# Patient Record
Sex: MALE | Race: WHITE | NOT HISPANIC OR LATINO | Employment: OTHER | URBAN - METROPOLITAN AREA
[De-identification: names, ages, dates, MRNs, and addresses within clinical notes are randomized per-mention and may not be internally consistent; named-entity substitution may affect disease eponyms.]

---

## 2019-09-01 ENCOUNTER — HOSPITAL ENCOUNTER (INPATIENT)
Facility: MEDICAL CENTER | Age: 74
LOS: 2 days | DRG: 069 | End: 2019-09-03
Attending: EMERGENCY MEDICINE | Admitting: HOSPITALIST
Payer: MEDICARE

## 2019-09-01 ENCOUNTER — HOSPITAL ENCOUNTER (OUTPATIENT)
Dept: RADIOLOGY | Facility: MEDICAL CENTER | Age: 74
End: 2019-09-01

## 2019-09-01 ENCOUNTER — APPOINTMENT (OUTPATIENT)
Dept: RADIOLOGY | Facility: MEDICAL CENTER | Age: 74
DRG: 069 | End: 2019-09-01
Attending: HOSPITALIST
Payer: MEDICARE

## 2019-09-01 DIAGNOSIS — I63.9 CEREBROVASCULAR ACCIDENT (CVA), UNSPECIFIED MECHANISM (HCC): ICD-10-CM

## 2019-09-01 DIAGNOSIS — I48.0 PAF (PAROXYSMAL ATRIAL FIBRILLATION) (HCC): ICD-10-CM

## 2019-09-01 DIAGNOSIS — I63.9 STROKE (HCC): ICD-10-CM

## 2019-09-01 DIAGNOSIS — I48.0 PAROXYSMAL A-FIB (HCC): ICD-10-CM

## 2019-09-01 DIAGNOSIS — Z79.01 ANTICOAGULATED: ICD-10-CM

## 2019-09-01 PROBLEM — D69.6 THROMBOCYTOPENIA (HCC): Status: ACTIVE | Noted: 2019-09-01

## 2019-09-01 LAB
ALBUMIN SERPL BCP-MCNC: 4.3 G/DL (ref 3.2–4.9)
ALBUMIN/GLOB SERPL: 1.4 G/DL
ALP SERPL-CCNC: 47 U/L (ref 30–99)
ALT SERPL-CCNC: 6 U/L (ref 2–50)
ANION GAP SERPL CALC-SCNC: 10 MMOL/L (ref 0–11.9)
AST SERPL-CCNC: 16 U/L (ref 12–45)
BASOPHILS # BLD AUTO: 0.3 % (ref 0–1.8)
BASOPHILS # BLD: 0.01 K/UL (ref 0–0.12)
BILIRUB SERPL-MCNC: 0.8 MG/DL (ref 0.1–1.5)
BUN SERPL-MCNC: 20 MG/DL (ref 8–22)
CALCIUM SERPL-MCNC: 9.3 MG/DL (ref 8.5–10.5)
CHLORIDE SERPL-SCNC: 104 MMOL/L (ref 96–112)
CO2 SERPL-SCNC: 24 MMOL/L (ref 20–33)
CREAT SERPL-MCNC: 0.93 MG/DL (ref 0.5–1.4)
EKG IMPRESSION: NORMAL
EKG IMPRESSION: NORMAL
EOSINOPHIL # BLD AUTO: 0.05 K/UL (ref 0–0.51)
EOSINOPHIL NFR BLD: 1.3 % (ref 0–6.9)
ERYTHROCYTE [DISTWIDTH] IN BLOOD BY AUTOMATED COUNT: 49.2 FL (ref 35.9–50)
EST. AVERAGE GLUCOSE BLD GHB EST-MCNC: 108 MG/DL
GLOBULIN SER CALC-MCNC: 3.1 G/DL (ref 1.9–3.5)
GLUCOSE SERPL-MCNC: 106 MG/DL (ref 65–99)
HBA1C MFR BLD: 5.4 % (ref 0–5.6)
HCT VFR BLD AUTO: 44.3 % (ref 42–52)
HGB BLD-MCNC: 14.9 G/DL (ref 14–18)
IMM GRANULOCYTES # BLD AUTO: 0.01 K/UL (ref 0–0.11)
IMM GRANULOCYTES NFR BLD AUTO: 0.3 % (ref 0–0.9)
INR PPP: 1.02 (ref 0.87–1.13)
LYMPHOCYTES # BLD AUTO: 1.07 K/UL (ref 1–4.8)
LYMPHOCYTES NFR BLD: 28.8 % (ref 22–41)
MAGNESIUM SERPL-MCNC: 2.3 MG/DL (ref 1.5–2.5)
MCH RBC QN AUTO: 30.7 PG (ref 27–33)
MCHC RBC AUTO-ENTMCNC: 33.6 G/DL (ref 33.7–35.3)
MCV RBC AUTO: 91.2 FL (ref 81.4–97.8)
MONOCYTES # BLD AUTO: 0.41 K/UL (ref 0–0.85)
MONOCYTES NFR BLD AUTO: 11.1 % (ref 0–13.4)
NEUTROPHILS # BLD AUTO: 2.16 K/UL (ref 1.82–7.42)
NEUTROPHILS NFR BLD: 58.2 % (ref 44–72)
NRBC # BLD AUTO: 0 K/UL
NRBC BLD-RTO: 0 /100 WBC
PLATELET # BLD AUTO: 152 K/UL (ref 164–446)
PMV BLD AUTO: 9.7 FL (ref 9–12.9)
POTASSIUM SERPL-SCNC: 4.1 MMOL/L (ref 3.6–5.5)
PROT SERPL-MCNC: 7.4 G/DL (ref 6–8.2)
PROTHROMBIN TIME: 13.6 SEC (ref 12–14.6)
RBC # BLD AUTO: 4.86 M/UL (ref 4.7–6.1)
SODIUM SERPL-SCNC: 138 MMOL/L (ref 135–145)
TROPONIN T SERPL-MCNC: 17 NG/L (ref 6–19)
WBC # BLD AUTO: 3.7 K/UL (ref 4.8–10.8)

## 2019-09-01 PROCEDURE — 700117 HCHG RX CONTRAST REV CODE 255: Performed by: INTERNAL MEDICINE

## 2019-09-01 PROCEDURE — 83735 ASSAY OF MAGNESIUM: CPT

## 2019-09-01 PROCEDURE — A9270 NON-COVERED ITEM OR SERVICE: HCPCS | Performed by: INTERNAL MEDICINE

## 2019-09-01 PROCEDURE — 84484 ASSAY OF TROPONIN QUANT: CPT

## 2019-09-01 PROCEDURE — 770020 HCHG ROOM/CARE - TELE (206)

## 2019-09-01 PROCEDURE — 99223 1ST HOSP IP/OBS HIGH 75: CPT | Mod: AI,25 | Performed by: HOSPITALIST

## 2019-09-01 PROCEDURE — 80053 COMPREHEN METABOLIC PANEL: CPT

## 2019-09-01 PROCEDURE — A9270 NON-COVERED ITEM OR SERVICE: HCPCS | Performed by: HOSPITALIST

## 2019-09-01 PROCEDURE — 93010 ELECTROCARDIOGRAM REPORT: CPT | Performed by: INTERNAL MEDICINE

## 2019-09-01 PROCEDURE — 83036 HEMOGLOBIN GLYCOSYLATED A1C: CPT

## 2019-09-01 PROCEDURE — 85025 COMPLETE CBC W/AUTO DIFF WBC: CPT

## 2019-09-01 PROCEDURE — 70551 MRI BRAIN STEM W/O DYE: CPT

## 2019-09-01 PROCEDURE — 700102 HCHG RX REV CODE 250 W/ 637 OVERRIDE(OP): Performed by: INTERNAL MEDICINE

## 2019-09-01 PROCEDURE — 99285 EMERGENCY DEPT VISIT HI MDM: CPT

## 2019-09-01 PROCEDURE — 70498 CT ANGIOGRAPHY NECK: CPT

## 2019-09-01 PROCEDURE — 93005 ELECTROCARDIOGRAM TRACING: CPT | Performed by: INTERNAL MEDICINE

## 2019-09-01 PROCEDURE — 700102 HCHG RX REV CODE 250 W/ 637 OVERRIDE(OP): Performed by: HOSPITALIST

## 2019-09-01 PROCEDURE — 85610 PROTHROMBIN TIME: CPT

## 2019-09-01 PROCEDURE — 93005 ELECTROCARDIOGRAM TRACING: CPT | Performed by: EMERGENCY MEDICINE

## 2019-09-01 PROCEDURE — 36415 COLL VENOUS BLD VENIPUNCTURE: CPT

## 2019-09-01 PROCEDURE — 99358 PROLONG SERVICE W/O CONTACT: CPT | Performed by: HOSPITALIST

## 2019-09-01 PROCEDURE — 70496 CT ANGIOGRAPHY HEAD: CPT

## 2019-09-01 RX ORDER — LORAZEPAM 1 MG/1
0.5 TABLET ORAL
Status: COMPLETED | OUTPATIENT
Start: 2019-09-01 | End: 2019-09-01

## 2019-09-01 RX ORDER — ONDANSETRON 2 MG/ML
4 INJECTION INTRAMUSCULAR; INTRAVENOUS EVERY 4 HOURS PRN
Status: DISCONTINUED | OUTPATIENT
Start: 2019-09-01 | End: 2019-09-03 | Stop reason: HOSPADM

## 2019-09-01 RX ORDER — LABETALOL HYDROCHLORIDE 5 MG/ML
10 INJECTION, SOLUTION INTRAVENOUS EVERY 4 HOURS PRN
Status: DISCONTINUED | OUTPATIENT
Start: 2019-09-01 | End: 2019-09-03 | Stop reason: HOSPADM

## 2019-09-01 RX ORDER — LORAZEPAM 1 MG/1
0.5 TABLET ORAL ONCE
Status: DISCONTINUED | OUTPATIENT
Start: 2019-09-01 | End: 2019-09-01

## 2019-09-01 RX ORDER — POLYETHYLENE GLYCOL 3350 17 G/17G
1 POWDER, FOR SOLUTION ORAL
Status: DISCONTINUED | OUTPATIENT
Start: 2019-09-01 | End: 2019-09-03 | Stop reason: HOSPADM

## 2019-09-01 RX ORDER — AMOXICILLIN 250 MG
2 CAPSULE ORAL 2 TIMES DAILY
Status: DISCONTINUED | OUTPATIENT
Start: 2019-09-01 | End: 2019-09-03 | Stop reason: HOSPADM

## 2019-09-01 RX ORDER — HYDRALAZINE HYDROCHLORIDE 20 MG/ML
10 INJECTION INTRAMUSCULAR; INTRAVENOUS
Status: DISCONTINUED | OUTPATIENT
Start: 2019-09-01 | End: 2019-09-03 | Stop reason: HOSPADM

## 2019-09-01 RX ORDER — GABAPENTIN 300 MG/1
300 CAPSULE ORAL 3 TIMES DAILY
Status: DISCONTINUED | OUTPATIENT
Start: 2019-09-01 | End: 2019-09-03 | Stop reason: HOSPADM

## 2019-09-01 RX ORDER — ASPIRIN 81 MG/1
324 TABLET, CHEWABLE ORAL DAILY
Status: DISCONTINUED | OUTPATIENT
Start: 2019-09-01 | End: 2019-09-02

## 2019-09-01 RX ORDER — ATORVASTATIN CALCIUM 80 MG/1
80 TABLET, FILM COATED ORAL EVERY EVENING
Status: DISCONTINUED | OUTPATIENT
Start: 2019-09-01 | End: 2019-09-03 | Stop reason: HOSPADM

## 2019-09-01 RX ORDER — BISACODYL 10 MG
10 SUPPOSITORY, RECTAL RECTAL
Status: DISCONTINUED | OUTPATIENT
Start: 2019-09-01 | End: 2019-09-03 | Stop reason: HOSPADM

## 2019-09-01 RX ORDER — ASPIRIN 300 MG/1
300 SUPPOSITORY RECTAL DAILY
Status: DISCONTINUED | OUTPATIENT
Start: 2019-09-01 | End: 2019-09-02

## 2019-09-01 RX ORDER — ASPIRIN 325 MG
325 TABLET ORAL DAILY
Status: DISCONTINUED | OUTPATIENT
Start: 2019-09-01 | End: 2019-09-02

## 2019-09-01 RX ORDER — ONDANSETRON 4 MG/1
4 TABLET, ORALLY DISINTEGRATING ORAL EVERY 4 HOURS PRN
Status: DISCONTINUED | OUTPATIENT
Start: 2019-09-01 | End: 2019-09-03 | Stop reason: HOSPADM

## 2019-09-01 RX ADMIN — RIVAROXABAN 20 MG: 20 TABLET, FILM COATED ORAL at 16:32

## 2019-09-01 RX ADMIN — IOHEXOL 80 ML: 350 INJECTION, SOLUTION INTRAVENOUS at 10:49

## 2019-09-01 RX ADMIN — ATORVASTATIN CALCIUM 80 MG: 80 TABLET, FILM COATED ORAL at 16:31

## 2019-09-01 RX ADMIN — GABAPENTIN 300 MG: 300 CAPSULE ORAL at 12:03

## 2019-09-01 RX ADMIN — GABAPENTIN 300 MG: 300 CAPSULE ORAL at 16:35

## 2019-09-01 RX ADMIN — ASPIRIN 325 MG: 325 TABLET, FILM COATED ORAL at 05:18

## 2019-09-01 RX ADMIN — GABAPENTIN 300 MG: 300 CAPSULE ORAL at 05:18

## 2019-09-01 RX ADMIN — LORAZEPAM 0.5 MG: 1 TABLET ORAL at 21:42

## 2019-09-01 ASSESSMENT — ENCOUNTER SYMPTOMS
ABDOMINAL PAIN: 0
COUGH: 0
WEAKNESS: 1
HEARTBURN: 0
CHILLS: 0
SHORTNESS OF BREATH: 1
FEVER: 0
HALLUCINATIONS: 0
NAUSEA: 0
BLURRED VISION: 0
SENSORY CHANGE: 1
EYE DISCHARGE: 0
SPEECH CHANGE: 1
FOCAL WEAKNESS: 0
DIZZINESS: 0
DEPRESSION: 0
PALPITATIONS: 0
BRUISES/BLEEDS EASILY: 0
HEMOPTYSIS: 0
DOUBLE VISION: 0
FLANK PAIN: 0
VOMITING: 0
MYALGIAS: 0

## 2019-09-01 ASSESSMENT — PATIENT HEALTH QUESTIONNAIRE - PHQ9
2. FEELING DOWN, DEPRESSED, IRRITABLE, OR HOPELESS: NOT AT ALL
1. LITTLE INTEREST OR PLEASURE IN DOING THINGS: NOT AT ALL
SUM OF ALL RESPONSES TO PHQ9 QUESTIONS 1 AND 2: 0

## 2019-09-01 ASSESSMENT — LIFESTYLE VARIABLES
AVERAGE NUMBER OF DAYS PER WEEK YOU HAVE A DRINK CONTAINING ALCOHOL: 0
EVER HAD A DRINK FIRST THING IN THE MORNING TO STEADY YOUR NERVES TO GET RID OF A HANGOVER: NO
HAVE PEOPLE ANNOYED YOU BY CRITICIZING YOUR DRINKING: NO
TOTAL SCORE: 0
DOES PATIENT WANT TO STOP DRINKING: NO
ON A TYPICAL DAY WHEN YOU DRINK ALCOHOL HOW MANY DRINKS DO YOU HAVE: 0
CONSUMPTION TOTAL: NEGATIVE
HAVE YOU EVER FELT YOU SHOULD CUT DOWN ON YOUR DRINKING: NO
EVER_SMOKED: NEVER
EVER FELT BAD OR GUILTY ABOUT YOUR DRINKING: NO
HOW MANY TIMES IN THE PAST YEAR HAVE YOU HAD 5 OR MORE DRINKS IN A DAY: 0
TOTAL SCORE: 0
TOTAL SCORE: 0
SUBSTANCE_ABUSE: 0
DOES PATIENT WANT TO TALK TO SOMEONE ABOUT QUITTING: NO
ALCOHOL_USE: NO

## 2019-09-01 ASSESSMENT — CHA2DS2 SCORE
CHF OR LEFT VENTRICULAR DYSFUNCTION: NO
DIABETES: NO
AGE 75 OR GREATER: NO
SEX: MALE
HYPERTENSION: YES
AGE 65 TO 74: YES
CHA2DS2 VASC SCORE: 4
VASCULAR DISEASE: NO
PRIOR STROKE OR TIA OR THROMBOEMBOLISM: YES

## 2019-09-01 NOTE — ASSESSMENT & PLAN NOTE
NIH Stroke scale of 4 on admission, Outside of window for TPA   Previous history of stroke and PFO  continue home xarelto  Asa and statin   CTA head and neck normal  MRI without acute stroke, hemorrhage or mass, did show areas of encephalomalacia in the right thalamus and adjacent internal capsule, posterior left temporal lobe and left cerebellum, in keeping with areas of remote ischemic injury, moderate atrophy, mild white matter changes  Echocardiogram pending  Pt/ot/speech evaluation   Monitor BP  Not taking home meds, wonder about ability to perform ADLs, may need SNF or HH

## 2019-09-01 NOTE — ED PROVIDER NOTES
ED Provider Note    Scribed for South Avendaño M.D. by Justine Larkin. 9/1/2019,  3:46 AM.    Means of Arrival: Med Flight  History obtained from: Patient  History limited by: None    CHIEF COMPLAINT  Chief Complaint   Patient presents with   • Numbness       HPI  Leander Sanz is a 73 y.o. male with a history of a stroke 6 months ago who presents to the Emergency Department for numbness onset prior to arrival. Per EMS, the patient was playing basketball at 3PM when he started feeling numbness on his left arm, shoulder and leg. Patient associates shortness of breath, but denies any fever, cough, hematuria, or chest pain. They further state the numbness lead to left sided facial droop that has since resolved. In addition, EMS mentions the patient has been irregularly taking his Xarelto and Gabapentin and that his head CT in an outside facility was negative for any abnormalities. Patient denies any present stroke symptoms that are continuous from his past stroke into today's visit. No fever or dysuria.     REVIEW OF SYSTEMS  CONSTITUTIONAL:  No fever.  CARDIOVASCULAR:  No chest discomfort.  RESPIRATORY:  No pleuritic chest pain, cough.  GASTROINTESTINAL:  No abdominal pain.  GENITOURINARY:   No dysuria, hematuria.  MUSCULOSKELETAL:  No arthralgia.  SKIN:  No rash or suspicious lesions.  NEUROLOGIC:  Numbness on his left arm, shoulder and leg. No headache.  ENDOCRINE:  No facial edema.  HEMATOLOGIC:  No abnormal bleeding.     See HPI for further details.   All other systems are negative.     PAST MEDICAL HISTORY  Past Medical History:   Diagnosis Date   • Hyperlipemia    • Hypertension    • Stroke (HCC)        FAMILY HISTORY  No family history on file.    SOCIAL HISTORY   reports that he has never smoked. He has never used smokeless tobacco. He reports that he does not drink alcohol or use drugs.    SURGICAL HISTORY  History reviewed. No pertinent surgical history.    CURRENT MEDICATIONS  Current  "Facility-Administered Medications:   •  Pharmacy consult request - Allow for permissive hypertension: SBP up to 220 mmHg/DBP up to 120 mmHg x 48 hours, , Other, PHARMACY TO DOSE, Ivis Stacy M.D.  •  labetalol (NORMODYNE,TRANDATE) injection 10 mg, 10 mg, Intravenous, Q4HRS PRN **OR** hydrALAZINE (APRESOLINE) injection 10 mg, 10 mg, Intravenous, Q2HRS PRN, Ivis Stacy M.D.  •  atorvastatin (LIPITOR) tablet 80 mg, 80 mg, Oral, Q EVENING, Ivis Stacy M.D.  •  aspirin (ASA) tablet 325 mg, 325 mg, Oral, DAILY, 325 mg at 09/01/19 0518 **OR** aspirin (ASA) chewable tab 324 mg, 324 mg, Oral, DAILY **OR** aspirin (ASA) suppository 300 mg, 300 mg, Rectal, DAILY, Ivis Stacy M.D.  •  senna-docusate (PERICOLACE or SENOKOT S) 8.6-50 MG per tablet 2 Tab, 2 Tab, Oral, BID **AND** polyethylene glycol/lytes (MIRALAX) PACKET 1 Packet, 1 Packet, Oral, QDAY PRN **AND** magnesium hydroxide (MILK OF MAGNESIA) suspension 30 mL, 30 mL, Oral, QDAY PRN **AND** bisacodyl (DULCOLAX) suppository 10 mg, 10 mg, Rectal, QDAY PRN, Ivis Stacy M.D.  •  ondansetron (ZOFRAN) syringe/vial injection 4 mg, 4 mg, Intravenous, Q4HRS PRN, Ivis Stacy M.D.  •  ondansetron (ZOFRAN ODT) dispertab 4 mg, 4 mg, Oral, Q4HRS PRN, Ivis Stacy M.D.  •  rivaroxaban (XARELTO) tablet 20 mg, 20 mg, Oral, PM MEAL, Ivis Stacy M.D.  •  gabapentin (NEURONTIN) capsule 300 mg, 300 mg, Oral, TID, Ivis Stacy M.D., 300 mg at 09/01/19 0518      ALLERGIES  No Known Allergies    PHYSICAL EXAM  VITAL SIGNS: BP (!) 172/97   Pulse 99   Resp 19   Ht 1.956 m (6' 5\")   Wt 102.1 kg (225 lb)   BMI 26.68 kg/m²    Gen: Alert, no acute distress  HEENT: ATNC  Eyes: PERRL, EOMI, normal conjunctiva.   Neck: trachea midline  Resp: no respiratory distress  CV: No JVD  Abd: non-distended  Ext: No deformities  Psych: normal mood  Neuro: Mild sensory loss, left side ataxia, speech fluent. NIH Score of 4 (2 for level of consciousness, 1 for " limb ataxia, 1 for sensory deficits)    DIAGNOSTIC STUDIES / PROCEDURES     EKG  Results for orders placed or performed during the hospital encounter of 19   EKG   Result Value Ref Range    Report       [facility]    Test Date:  2019  Pt Name:    ZOE PALUMBO                Department: ER  MRN:        [id number]                      Room:       [facility]  Gender:     Male                         Technician: [id number]  :        1945                   Requested By:SOUTH AVENDAÑO  Order #:    [id number]                    Reading MD: South Avendaño    Measurements  Intervals                                Axis  Rate:       59                           P:          36  PA:         192                          QRS:        -44  QRSD:       98                           T:          125  QT:         436  QTc:        432    Interpretive Statements  SINUS BRADYCARDIA  LEFT AXIS DEVIATION  EARLY PRECORDIAL R/S TRANSITION  BORDERLINE REPOLARIZATION ABNORMALITY  Compared to ECG 2014 18:14:33  Left-axis deviation now present  Left anterior fascicular block no longer present    Electronically Signed On 2019 4:55:17 PDT by South Avendaño          LABS  Labs Reviewed   CBC WITH DIFFERENTIAL - Abnormal; Notable for the following components:       Result Value    WBC 3.7 (*)     MCHC 33.6 (*)     Platelet Count 152 (*)     All other components within normal limits    Narrative:     Indicate which anticoagulants the patient is on:->UNKNOWN   COMP METABOLIC PANEL - Abnormal; Notable for the following components:    Glucose 106 (*)     All other components within normal limits    Narrative:     Indicate which anticoagulants the patient is on:->UNKNOWN   PROTHROMBIN TIME    Narrative:     Indicate which anticoagulants the patient is on:->UNKNOWN   ESTIMATED GFR    Narrative:     Indicate which anticoagulants the patient is on:->UNKNOWN   HEMOGLOBIN A1C     All labs reviewed by  me.    RADIOLOGY  OUTSIDE IMAGES-CT CHEST   Final Result      OUTSIDE IMAGES-CT HEAD   Final Result      MR-BRAIN-W/O    (Results Pending)   EC-ECHOCARDIOGRAM COMPLETE W/O CONT    (Results Pending)   CT-CTA HEAD WITH & W/O-POST PROCESS    (Results Pending)   CT-CTA NECK WITH & W/O-POST PROCESSING    (Results Pending)     The radiologist’s interpretation of all radiology studies have been reviewed by me.    COURSE & MEDICAL DECISION MAKING  Pertinent Labs & Imaging studies reviewed. (See chart for details)    3:46 AM Patient seen and examined at bedside. Ordered Estimated GFR, Prothrombin, CMP, CBC with differential and EKG to evaluate. Patient was transferred for a neurology consult. The reports stated no ataxia. Patient had a CT scan and PET study which showed a small hiatal hernia and atelectasis. Troponin is at 0.01, Metabolic panel was normal, and UA was negative. Head CT without contrast showed no new findings, but shows an chronic leftoccipital lobe infarct.    4:00 AM I discussed the patient's case and the above findings with Dr. Stacy (Hospitalist) who agrees to admit the patient.     4:04 AM Patient was reevaluated at bedside. Discussed lab results with the patient and informed them of the plan for admission due to the possibility of a stroke. Patient verbalizes understanding and agreement to this plan of care.     Medical Decision Making:  Patient presents as transfer with stroke.  He continues to have left-sided deficits.  CTA chest was negative for pulmonary embolism.  He has clear lung sounds.  Low suspicion for COPD/asthma.  There is no signs or symptoms of pulmonary edema.  Patient is outside of the window for TPA.  His symptoms do not meet criteria for thrombectomy.  Patient will be admitted to the hospital for further work-up and treatment of his stroke.  There is no evidence of alternative diagnoses, such as Fredy's paralysis, seizure, dystonic reaction, or other findings.    DISPOSITION:  Patient  will be admitted to Dr. Stacy in guarded condition.      FINAL IMPRESSION  1. Cerebrovascular accident (CVA), unspecified mechanism (HCC)            I, Justine Larkin (Scribe), am scribing for, and in the presence of, South Avendaño M.D..    Electronically signed by: Justine Larkin (Scribe), 9/1/2019    ISouth M.D. personally performed the services described in this documentation, as scribed by Justine Larkin in my presence, and it is both accurate and complete. C    The note accurately reflects work and decisions made by me.  South Avendaño  9/1/2019  8:23 AM

## 2019-09-01 NOTE — PROGRESS NOTES
Patient was seen at bedside and chart was reviewed. Please see Dr. Stacy's note for further details.    Briefly,    Mr. Sanz is a 74 yo M with a history of recent previous stroke, A. Fib on AC however poor compliance, PFO, hypertension who presents with new strokelike symptoms including left arm numbness, speech changes and trouble with balance.     Patient also had chest tightness and SOB, OSH CT PE negative, troponin negative x 2.  CT brain without acute intracranial abnormalities, chronic left occipital lobe infarct unchanged.  CTA head and negative negative.      Patient seen and examined, still having left sided numbness. Reports that he's having more difficulty remember to take his medications.  Lives alone in [place], [place].     Vital signs notable for afebrile, heart rate 60s to 90s, respiratory rate 18-22 with normal oxygen saturation on room air, blood pressure systolics 140s-170s.  On exam patient elderly male, no acute distress.  Bradycardic without murmurs.  Lungs clear to auscultation bilaterally.  Abdominal exam normal bowel sounds, soft, nondistended, nontender.  Lower extremities without edema bilaterally.  Neuro exam notable for awake, alert, oriented x2 (self, place).  Cranial nerves II-XII intact.  Numbness to left arm and left leg.  Left hand strength 4/5, normal right-sided strength, normal lower extremity strength bilaterally.  Normal cerebellar function to severe finger-to-nose.  Did not assess gait.    Assessment and plan: 73-year-old male with previous strokes, A. fib on anticoagulation however noncompliant as well as hypertension who presents with new strokelike symptoms.  Follow-up MRI, echo, PT/OT/ST.  Still has some chest tightness but unsure if that is the numbness.  Negative CT PE, negative troponin x2, no acute ST/T wave changes, on telemetry continue to monitor.  Likely will need SNF placement.

## 2019-09-01 NOTE — DISCHARGE PLANNING
Aware of PMR referral from Dr. Stacy. Stroke-like symptoms. Workup ongoing, therapy evals pending. No Physiatry consult ordered per protocol at this time. TCC following. Thank you for the referral.

## 2019-09-01 NOTE — PROGRESS NOTES
Report received from HARRY Sosa. Pt transported via gurney with 2 ACLS RNs.  Pt on transport [facility] and arrived to unit at 0445. Pt on RA; tele monitor in place, monitor room notified.   NIHSS and 2 RN skin check completed.   Pt A&Ox4, numbness/tingling to L side noted. Ataxia to LUE noted.   Pt is 1x hand held assist with a steady gait.   No c/o pain, SOB, N/V/D.   Medication regimen and POC discussed with pt.   Medications administered per MAR.   No needs at this time.

## 2019-09-01 NOTE — ASSESSMENT & PLAN NOTE
Hx of on xarelto will continue for now   Cont cardiac monitoring   No rate or rhythm control agent

## 2019-09-01 NOTE — ED TRIAGE NOTES
Pt transferred from [facility] for Neuro consult. Pt states yesterday morning he noticed numbness on the L side of his face, spreading down his L arm and leg and around his mouth.

## 2019-09-01 NOTE — H&P
Hospital Medicine History & Physical Note    Date of Service  9/1/2019    Primary Care Physician  Don Kirkpatrick M.D.    Consultants  none    Code Status  full    Chief Complaint  cva     History of Presenting Illness  73 y.o. male who presented 9/1/2019 with past medical history of atrial fibrillation, previous stroke, hypertension, PFO who presents with left-sided numbness.  This patient was at a basketball game earlier.  He was playing basketball started feeling numbness and tingling to his left arm shoulder and leg.  He also had some mild shortness of breath.  He also had some left-sided facial droop that has improved.  Is also had some changes in speech dizziness and trouble with his balance.  NIH stroke scale of 4.  He had similar events about 1 week ago.  He has been noncompliant with his home medications.  He has been irregularly taking his Xarelto and gabapentin.  He will be admitted to the hospital for further stroke work-up.    Upon review of outside hospital records patient has CTA chest negative PE study there is bilateral dependent pulmonary atelectasis.  The lungs otherwise appear clear small hiatal hernia troponin 0.01 sodium 142 potassium 4.0 chloride 107 CO2 22 glucose 101 BUN 18 creatinine 1.02 AST 16 ALT 11 alkaline phosphatase 65 total bilirubin 0.6 LFTs otherwise unremarkable PTT 33.9 urinalysis unremarkable INR 1.0 WBC count 4.5 hemoglobin 15.8 platelet count 169 CT of the head no acute intracranial abnormalities chronic left occipital lobe infarct unchanged in appearance    Review of Systems  Review of Systems   Constitutional: Positive for malaise/fatigue. Negative for chills and fever.   HENT: Negative for congestion, hearing loss and tinnitus.    Eyes: Negative for blurred vision, double vision and discharge.   Respiratory: Positive for shortness of breath. Negative for cough and hemoptysis.    Cardiovascular: Negative for chest pain, palpitations and leg swelling.   Gastrointestinal:  Negative for abdominal pain, heartburn, nausea and vomiting.   Genitourinary: Negative for dysuria and flank pain.   Musculoskeletal: Negative for joint pain and myalgias.   Skin: Negative for rash.   Neurological: Positive for sensory change, speech change and weakness. Negative for dizziness and focal weakness.   Endo/Heme/Allergies: Negative for environmental allergies. Does not bruise/bleed easily.   Psychiatric/Behavioral: Negative for depression, hallucinations and substance abuse.       Past Medical History   has a past medical history of Hyperlipemia, Hypertension, and Stroke (HCC).    Surgical History  Reviewed and not pertinent     Family History  Reviewed and not pertinent     Social History   reports that he has never smoked. He has never used smokeless tobacco. He reports that he does not drink alcohol or use drugs.    Allergies  No Known Allergies    Medications  Prior to Admission Medications   Prescriptions Last Dose Informant Patient Reported? Taking?   gabapentin (NEURONTIN) 300 MG CAPS  Patient No No   Sig: Take 1 Cap by mouth 3 times a day.   rivaroxaban (XARELTO) 20 MG Tab tablet   No No   Sig: Take 1 Tab by mouth with dinner.   warfarin (COUMADIN) 5 MG TABS   No No   Sig: Take 1 Tab by mouth every day.      Facility-Administered Medications: None       Physical Exam  Pulse:  [99] 99  Resp:  [19] 19  BP: (172)/(97) 172/97    Physical Exam   Constitutional: He is oriented to person, place, and time. He appears well-developed and well-nourished. He appears distressed.   HENT:   Head: Normocephalic and atraumatic.   Eyes: Pupils are equal, round, and reactive to light. Conjunctivae and EOM are normal.   Neck: Normal range of motion. Neck supple. No JVD present.   Cardiovascular: Normal rate, regular rhythm, normal heart sounds and intact distal pulses.   No murmur heard.  Pulmonary/Chest: Effort normal and breath sounds normal. No respiratory distress. He exhibits no tenderness.   Abdominal: Soft.  Bowel sounds are normal. He exhibits no distension. There is no tenderness.   Musculoskeletal: Normal range of motion. He exhibits no edema.   Neurological: He is alert and oriented to person, place, and time. No cranial nerve deficit. He exhibits normal muscle tone.   Mild left sided weakness   Skin: Skin is warm and dry. No erythema.   Psychiatric: He has a normal mood and affect. His behavior is normal. Judgment and thought content normal.   Nursing note and vitals reviewed.      Laboratory:           Recent Labs     09/01/19  0345   WBC 3.7*   RBC 4.86   HEMOGLOBIN 14.9   HEMATOCRIT 44.3   MCV 91.2   MCH 30.7   RDW 49.2   PLATELETCT 152*   MPV 9.7   NEUTSPOLYS 58.20   LYMPHOCYTES 28.80   MONOCYTES 11.10   EOSINOPHILS 1.30   BASOPHILS 0.30       Recent Labs     09/01/19  0345   ALTSGPT 6   ASTSGOT 16   ALKPHOSPHAT 47   TBILIRUBIN 0.8   GLUCOSE 106*         No results for input(s): NTPROBNP in the last 72 hours.      No results for input(s): TROPONINT in the last 72 hours.    Urinalysis:    No results found     Imaging:  MR-BRAIN-W/O    (Results Pending)   EC-ECHOCARDIOGRAM COMPLETE W/O CONT    (Results Pending)   CT-CTA HEAD WITH & W/O-POST PROCESS    (Results Pending)   CT-CTA NECK WITH & W/O-POST PROCESSING    (Results Pending)         Assessment/Plan:  I anticipate this patient will require at least two midnights for appropriate medical management, necessitating inpatient admission.    * CVA (cerebral vascular accident) (HCC)- (present on admission)  Assessment & Plan  NIH Stroke scale of 4, Outside of window for TPA   Previous history of stroke and PFO  Admit to neuro   Resume home xarelto  Asa and statin   Mri brain  CTA head and neck   Echocardiogram for evaluation   Pt/ot/speech evaluation   Allow for permissive htn  Consider neuro evaluation in am      If MRI brain with acute infarct he will need to have DVT r/o given hx of PFO       Hypertension- (present on admission)  Assessment & Plan  Allow for  permissive htn     Thrombocytopenia (HCC)  Assessment & Plan  Mild cont to montior     PFO with atrial septal aneurysm- (present on admission)  Assessment & Plan  Hx of, will recheck echocardiogram     PAF (paroxysmal atrial fibrillation) (HCC)- (present on admission)  Assessment & Plan  Hx of on xarelto will continue for now   Cont cardiac monitoring   No rate or rhythm control agent       VTE prophylaxis: heparin     I spent a total of 32 minutes of non face to face time performing additional research, reviewing old medical records, provided on going management by following up on labs, placing orders, discussing plan of care with other healthcare providers and nursing staff. Start time: 4:05 am. End time: 4:37 am

## 2019-09-02 ENCOUNTER — APPOINTMENT (OUTPATIENT)
Dept: CARDIOLOGY | Facility: MEDICAL CENTER | Age: 74
DRG: 069 | End: 2019-09-02
Attending: HOSPITALIST
Payer: MEDICARE

## 2019-09-02 LAB
BASOPHILS # BLD AUTO: 0.3 % (ref 0–1.8)
BASOPHILS # BLD: 0.01 K/UL (ref 0–0.12)
CHOLEST SERPL-MCNC: 200 MG/DL (ref 100–199)
EOSINOPHIL # BLD AUTO: 0.12 K/UL (ref 0–0.51)
EOSINOPHIL NFR BLD: 3.4 % (ref 0–6.9)
ERYTHROCYTE [DISTWIDTH] IN BLOOD BY AUTOMATED COUNT: 49.3 FL (ref 35.9–50)
HCT VFR BLD AUTO: 43.7 % (ref 42–52)
HDLC SERPL-MCNC: 30 MG/DL
HGB BLD-MCNC: 14.7 G/DL (ref 14–18)
IMM GRANULOCYTES # BLD AUTO: 0.01 K/UL (ref 0–0.11)
IMM GRANULOCYTES NFR BLD AUTO: 0.3 % (ref 0–0.9)
LDLC SERPL CALC-MCNC: 154 MG/DL
LV EJECT FRACT  99904: 65
LV EJECT FRACT MOD 2C 99903: 70.59
LV EJECT FRACT MOD 4C 99902: 59.82
LV EJECT FRACT MOD BP 99901: 64.56
LYMPHOCYTES # BLD AUTO: 1.1 K/UL (ref 1–4.8)
LYMPHOCYTES NFR BLD: 30.9 % (ref 22–41)
MCH RBC QN AUTO: 31.1 PG (ref 27–33)
MCHC RBC AUTO-ENTMCNC: 33.6 G/DL (ref 33.7–35.3)
MCV RBC AUTO: 92.4 FL (ref 81.4–97.8)
MONOCYTES # BLD AUTO: 0.28 K/UL (ref 0–0.85)
MONOCYTES NFR BLD AUTO: 7.9 % (ref 0–13.4)
NEUTROPHILS # BLD AUTO: 2.04 K/UL (ref 1.82–7.42)
NEUTROPHILS NFR BLD: 57.2 % (ref 44–72)
NRBC # BLD AUTO: 0 K/UL
NRBC BLD-RTO: 0 /100 WBC
PLATELET # BLD AUTO: 132 K/UL (ref 164–446)
PMV BLD AUTO: 9.6 FL (ref 9–12.9)
RBC # BLD AUTO: 4.73 M/UL (ref 4.7–6.1)
TRIGL SERPL-MCNC: 79 MG/DL (ref 0–149)
WBC # BLD AUTO: 3.6 K/UL (ref 4.8–10.8)

## 2019-09-02 PROCEDURE — 770020 HCHG ROOM/CARE - TELE (206)

## 2019-09-02 PROCEDURE — 97165 OT EVAL LOW COMPLEX 30 MIN: CPT

## 2019-09-02 PROCEDURE — 93306 TTE W/DOPPLER COMPLETE: CPT | Mod: 26 | Performed by: INTERNAL MEDICINE

## 2019-09-02 PROCEDURE — 85025 COMPLETE CBC W/AUTO DIFF WBC: CPT

## 2019-09-02 PROCEDURE — A9270 NON-COVERED ITEM OR SERVICE: HCPCS | Performed by: HOSPITALIST

## 2019-09-02 PROCEDURE — 93306 TTE W/DOPPLER COMPLETE: CPT

## 2019-09-02 PROCEDURE — 99233 SBSQ HOSP IP/OBS HIGH 50: CPT | Performed by: INTERNAL MEDICINE

## 2019-09-02 PROCEDURE — 700102 HCHG RX REV CODE 250 W/ 637 OVERRIDE(OP): Performed by: HOSPITALIST

## 2019-09-02 PROCEDURE — 80061 LIPID PANEL: CPT

## 2019-09-02 PROCEDURE — 97161 PT EVAL LOW COMPLEX 20 MIN: CPT

## 2019-09-02 PROCEDURE — 36415 COLL VENOUS BLD VENIPUNCTURE: CPT

## 2019-09-02 RX ADMIN — GABAPENTIN 300 MG: 300 CAPSULE ORAL at 04:27

## 2019-09-02 RX ADMIN — ATORVASTATIN CALCIUM 80 MG: 80 TABLET, FILM COATED ORAL at 17:22

## 2019-09-02 RX ADMIN — ASPIRIN 325 MG: 325 TABLET, FILM COATED ORAL at 04:27

## 2019-09-02 RX ADMIN — GABAPENTIN 300 MG: 300 CAPSULE ORAL at 17:22

## 2019-09-02 RX ADMIN — RIVAROXABAN 20 MG: 20 TABLET, FILM COATED ORAL at 17:22

## 2019-09-02 RX ADMIN — GABAPENTIN 300 MG: 300 CAPSULE ORAL at 12:23

## 2019-09-02 ASSESSMENT — ENCOUNTER SYMPTOMS
VOMITING: 0
BLURRED VISION: 0
SHORTNESS OF BREATH: 0
SENSORY CHANGE: 1
CHILLS: 0
DOUBLE VISION: 0
FEVER: 0
NAUSEA: 0

## 2019-09-02 ASSESSMENT — COGNITIVE AND FUNCTIONAL STATUS - GENERAL
CLIMB 3 TO 5 STEPS WITH RAILING: A LITTLE
WALKING IN HOSPITAL ROOM: A LITTLE
DAILY ACTIVITIY SCORE: 21
SUGGESTED CMS G CODE MODIFIER DAILY ACTIVITY: CJ
MOBILITY SCORE: 21
DRESSING REGULAR LOWER BODY CLOTHING: A LITTLE
SUGGESTED CMS G CODE MODIFIER MOBILITY: CJ
MOVING FROM LYING ON BACK TO SITTING ON SIDE OF FLAT BED: A LITTLE
HELP NEEDED FOR BATHING: A LITTLE
TOILETING: A LITTLE

## 2019-09-02 ASSESSMENT — GAIT ASSESSMENTS
DEVIATION: ATAXIC;BRADYKINETIC
DISTANCE (FEET): 125
GAIT LEVEL OF ASSIST: SUPERVISED

## 2019-09-02 ASSESSMENT — ACTIVITIES OF DAILY LIVING (ADL): TOILETING: INDEPENDENT

## 2019-09-02 NOTE — PROGRESS NOTES
Hospital Medicine Daily Progress Note    Date of Service  9/2/2019    Chief Complaint  73 y.o. male with a history of recent prior stroke, atrial fibrillation on anticoagulation however poor compliance, PFO, hypertension admitted 9/1/2019 with shortness of breath, left arm numbness, speech changes and trouble with balance.    Hospital Course    #Shortness of breath: CT PE performed outpatient and was negative for PE or acute pneumonia.  Troponins negative x2.  EKG without acute ST/T wave changes.  By hospital day #2 shortness of breath resolved, had normal respiratory effort on room air with normal lung exam.  Echocardiogram performed and showed**    #Left arm numbness, speech changes, trouble with balance: CT brain without acute intracranial abnormalities, CTA head and neck normal, MRI brain was without acute ischemia, hemorrhage or mass did show areas of encephalomalacia in the right thalamus and adjacent internal capsule, posterior left temporal lobe and left cerebellum, in keeping with areas of remote ischemic injury, moderate atrophy, mild white matter changes.      Interval Problem Update  -No acute events overnight  -Did have some PVCs and bigeminy on telemetry, EKG performed and showed sinus bradycardia with left anterior fascicular block, patient without chest pain or shortness of breath  -Patient reports feeling much better today in regards to shortness of breath however still does have left arm and leg numbness  -, A1c 5.4    Consultants/Specialty  PT/OT/ST    Code Status  FULL CODE    Disposition  Inpatient to complete stroke workup then likely SNF vs home health    Review of Systems  Review of Systems   Constitutional: Negative for chills and fever.   Eyes: Negative for blurred vision and double vision.   Respiratory: Negative for shortness of breath.    Cardiovascular: Negative for chest pain and leg swelling.   Gastrointestinal: Negative for nausea and vomiting.   Skin: Negative for rash.    Neurological: Positive for sensory change.   All other systems reviewed and are negative.       Physical Exam  Temp:  [36.4 °C (97.6 °F)-37.3 °C (99.1 °F)] 36.4 °C (97.6 °F)  Pulse:  [53-81] 53  Resp:  [16-18] 16  BP: (128-171)/() 133/81  SpO2:  [94 %-97 %] 95 %    Physical Exam   Constitutional: He appears well-developed and well-nourished. No distress.   HENT:   Head: Normocephalic and atraumatic.   Mouth/Throat: Oropharynx is clear and moist.   Eyes: No scleral icterus.   Left pupil 3mm, right pupil 2mm, reactive to light b/l (chronic from admission)   Neck: Normal range of motion. Neck supple.   Cardiovascular: Intact distal pulses.   Bradycardic, normal S1 and S2, no m/r/g   Pulmonary/Chest: Effort normal. No respiratory distress. He has no wheezes. He has no rales.   Abdominal: Soft. Bowel sounds are normal. He exhibits no distension. There is no tenderness. There is no rebound and no guarding.   Musculoskeletal: He exhibits no edema or deformity.   Neurological: He is alert. A sensory deficit is present. No cranial nerve deficit.   Numbness to left arm/hand, LUE 4/5 (feels improved from yesterday), RUE 5/5, LE 5/5 b/l       Fluids    Intake/Output Summary (Last 24 hours) at 9/2/2019 0919  Last data filed at 9/1/2019 1200  Gross per 24 hour   Intake 240 ml   Output --   Net 240 ml       Laboratory  Recent Labs     09/01/19 0345 09/02/19  0411   WBC 3.7* 3.6*   RBC 4.86 4.73   HEMOGLOBIN 14.9 14.7   HEMATOCRIT 44.3 43.7   MCV 91.2 92.4   MCH 30.7 31.1   MCHC 33.6* 33.6*   RDW 49.2 49.3   PLATELETCT 152* 132*   MPV 9.7 9.6     Recent Labs     09/01/19  0345   SODIUM 138   POTASSIUM 4.1   CHLORIDE 104   CO2 24   GLUCOSE 106*   BUN 20   CREATININE 0.93   CALCIUM 9.3     Recent Labs     09/01/19  0345   INR 1.02         Recent Labs     09/02/19  0411   TRIGLYCERIDE 79   HDL 30*   *       Imaging  MR-BRAIN-W/O   Final Result      1.  No acute ischemia, hemorrhage or mass   2.  Areas of  encephalomalacia in the RIGHT thalamus and adjacent internal capsule, posterior LEFT temporal lobe and LEFT cerebellum, in keeping with areas of remote ischemic injury   3.  Moderate atrophy   4.  Mild white matter changes      CT-CTA HEAD WITH & W/O-POST PROCESS   Final Result      CT angiogram of the [place] within normal limits.      CT-CTA NECK WITH & W/O-POST PROCESSING   Final Result      CT angiogram of the neck within normal limits.      OUTSIDE IMAGES-CT CHEST   Final Result      OUTSIDE IMAGES-CT HEAD   Final Result      EC-ECHOCARDIOGRAM COMPLETE W/O CONT    (Results Pending)        Assessment/Plan  * CVA (cerebral vascular accident) ([facility])- (present on admission)  Assessment & Plan  NIH Stroke scale of 4 on admission, Outside of window for TPA   Previous history of stroke and PFO  continue home xarelto  Asa and statin   CTA head and neck normal  MRI without acute stroke, hemorrhage or mass, did show areas of encephalomalacia in the right thalamus and adjacent internal capsule, posterior left temporal lobe and left cerebellum, in keeping with areas of remote ischemic injury, moderate atrophy, mild white matter changes  Echocardiogram pending  Pt/ot/speech evaluation   Monitor BP  Not taking home meds, wonder about ability to perform ADLs, may need SNF or             Hypertension- (present on admission)  Assessment & Plan  Allow for permissive htn     Thrombocytopenia ([facility])  Assessment & Plan  Mild cont to montior     PFO with atrial septal aneurysm- (present on admission)  Assessment & Plan  Hx of, will recheck echocardiogram     PAF (paroxysmal atrial fibrillation) ([facility])- (present on admission)  Assessment & Plan  Hx of on xarelto will continue for now   Cont cardiac monitoring   No rate or rhythm control agent        VTE prophylaxis: on home anticoagulation

## 2019-09-02 NOTE — FACE TO FACE
Face to Face Note  -  Durable Medical Equipment    Karey Billingsley M.D. - NPI: [id number]  I certify that this patient is under my care and that they have had a durable medical equipment(DME)face to face encounter by myself that meets the physician DME face-to-face encounter requirements with this patient on:    Date of encounter:   Patient:                    MRN:                       YOB: 2017  Leander Sanz  [id number]  1945     The encounter with the patient was in whole, or in part, for the following medical condition, which is the primary reason for durable medical equipment:  CVA    I certify that, based on my findings, the following durable medical equipment is medically necessary:  Walkers.    HOME O2 Saturation Measurements:(Values must be present for Home Oxygen orders)         ,     ,         My Clinical findings support the need for the above equipment due to:  Abnormal Gait    Supporting Symptoms: left arm/leg numbness, generalized weakness, deconditioning    ------------------------------------------------------------------------------------------------------------------    Face to Face Supporting Documentation - Home Health    The encounter with this patient was in whole or in part the primary reason for home health admission.    Date of encounter:   Patient:                    MRN:                       YOB: 2017  Leander Sanz  [id number]  1945     Home health to see patient for:  Home health aide, Physical Therapy evaluation and treatment and Occupational therapy evaluation and treatment    Skilled need for:  Recent Deterioration of Health Status prior strokes and worsening dementia and Medication Management (has difficulty remember medications), improve mobility and evaluate home for safety     Skilled nursing interventions to include:  Comment: medication management     Homebound evidenced status by:  Needs the assistance of another  person in order to leave the home. Leaving home must require a considerable and taxing effort. There must exist a normal inability to leave the home.    Community Physician to provide follow up care: Don Kirkpatrick M.D.     Optional Interventions    Wound information & treatment:    Home Infusion Therapy orders:    Line/Drain/Airway:    I certify the face to face encounter for this home care referral meets the CMS requirements and the encounter/clinical assessment with the patient was, in whole, or in part, for the medical condition(s) listed above, which is the primary reason for home health care. Based on my clinical findings: the service(s) are medically necessary, support the need for home health care, and the homebound criteria are met.  I certify that this patient has had a face to face encounter by myself.  Karey Billingsley M.D. - NPI: [id number]    *Debility, frailty and advanced age in the absence of an acute deterioration or exacerbation of a condition do not qualify a patient for home health.

## 2019-09-02 NOTE — PROGRESS NOTES
Notified Dr Gallagher EKG results. No new orders received.   Pt denies chest pain at this time. Will continue to monitor.

## 2019-09-02 NOTE — THERAPY
"Occupational Therapy Evaluation completed.   Functional Status:  Pt is a 72 y/o female admitted for L arm numbness, imbalance. Pt has prior hx of stroke. Pt flat affect, but appears to be his baseline. Pt lives alone in [place], [place]. Pt reports he had onset of numbness and currently has impaired sensation of L side of body (pins/needles sensation). Pt was SPV OOB, needed no assist to stand. Pt ambulated with no device 100' in hallway. Pt may benefit from a FWW, but defer to PT for recommendation. Pt dressed LE with SPV. Pt will benefit from home health services to improve mobility and evaluate home for safety upon return.   Plan of Care: No further acute OT needs, DC needs only.    Discharge Recommendations:  Equipment: Will Continue to Assess for Equipment Needs. Post-acute therapy Discharge to home with outpatient or home health for additional skilled therapy services.    See \"Rehab Therapy-Acute\" Patient Summary Report for complete documentation.    "

## 2019-09-02 NOTE — PROGRESS NOTES
Monitor Summary: SB-SR 56-67, AK .20, QRS .08, QT .48 with freq PVCs, occas to freq trigeminy, rare couplets, BBB beats, and HR as low as 46 per strip from monitor room

## 2019-09-02 NOTE — THERAPY
"Physical Therapy Evaluation completed.   Bed Mobility:  Supine to Sit: Supervised  Transfers: Sit to Stand: Supervised  Gait: Level Of Assist: Supervised with No Equipment Needed       Plan of Care: Patient with no further skilled PT needs in the acute care setting at this time  Discharge Recommendations: Equipment: No Equipment Needed. Post-acute therapy Recommend home health transitional care for continued physical therapy services.     Mr. Sanz is a 74 y/o male who presents to acute secondary to L sided weakness. MRI found encephalomalacia in R thalamus, L temporal lobe, and L cerebellum. He does present with mild L hip flexor/hamstring weakness upont MMT, however this does not appear to impact his gait. He was able to perform gait, transfers, and bed mobility without physical assist. Mildly ataxic gait pattern but no overt LOB. He even stood on one leg to fix his sock without LOB. Due to MRI findings ataxia may be chronic. Pt with flat affect and poor ability to give history. As he is ambulating without assist and without AD, no additional acute PT needs. Recommend follow up with home health services. Patient will not be actively followed for physical therapy services at this time, however may be seen if requested by physician for 1 more visit within 30 days to address any discharge or equipment needs    See \"Rehab Therapy-Acute\" Patient Summary Report for complete documentation.     "

## 2019-09-02 NOTE — PROGRESS NOTES
Monitor summary: SB-SR 56-78, OR 0.18, QRS 0.10, QT 0.44, with frequent PVCs and trigeminy, bigeminy, couplets and triplets per strip from monitor room.

## 2019-09-03 ENCOUNTER — PATIENT OUTREACH (OUTPATIENT)
Dept: HEALTH INFORMATION MANAGEMENT | Facility: OTHER | Age: 74
End: 2019-09-03

## 2019-09-03 VITALS
OXYGEN SATURATION: 93 % | DIASTOLIC BLOOD PRESSURE: 93 MMHG | WEIGHT: 223.55 LBS | TEMPERATURE: 97.7 F | RESPIRATION RATE: 16 BRPM | HEIGHT: 77 IN | BODY MASS INDEX: 26.4 KG/M2 | HEART RATE: 60 BPM | SYSTOLIC BLOOD PRESSURE: 137 MMHG

## 2019-09-03 PROCEDURE — A9270 NON-COVERED ITEM OR SERVICE: HCPCS | Performed by: HOSPITALIST

## 2019-09-03 PROCEDURE — 700102 HCHG RX REV CODE 250 W/ 637 OVERRIDE(OP): Performed by: INTERNAL MEDICINE

## 2019-09-03 PROCEDURE — 99239 HOSP IP/OBS DSCHRG MGMT >30: CPT | Performed by: INTERNAL MEDICINE

## 2019-09-03 PROCEDURE — A9270 NON-COVERED ITEM OR SERVICE: HCPCS | Performed by: INTERNAL MEDICINE

## 2019-09-03 PROCEDURE — 700102 HCHG RX REV CODE 250 W/ 637 OVERRIDE(OP): Performed by: HOSPITALIST

## 2019-09-03 RX ORDER — ATORVASTATIN CALCIUM 80 MG/1
80 TABLET, FILM COATED ORAL EVERY EVENING
Qty: 30 TAB | Refills: 0 | Status: SHIPPED | OUTPATIENT
Start: 2019-09-03

## 2019-09-03 RX ORDER — ATORVASTATIN CALCIUM 80 MG/1
80 TABLET, FILM COATED ORAL EVERY EVENING
Qty: 30 TAB | Refills: 0 | Status: SHIPPED | OUTPATIENT
Start: 2019-09-03 | End: 2019-09-03 | Stop reason: SDUPTHER

## 2019-09-03 RX ADMIN — RIVAROXABAN 20 MG: 20 TABLET, FILM COATED ORAL at 16:53

## 2019-09-03 RX ADMIN — GABAPENTIN 300 MG: 300 CAPSULE ORAL at 05:54

## 2019-09-03 RX ADMIN — GABAPENTIN 300 MG: 300 CAPSULE ORAL at 13:00

## 2019-09-03 RX ADMIN — ASPIRIN 81 MG: 81 TABLET, COATED ORAL at 05:54

## 2019-09-03 RX ADMIN — GABAPENTIN 300 MG: 300 CAPSULE ORAL at 16:53

## 2019-09-03 RX ADMIN — ATORVASTATIN CALCIUM 80 MG: 80 TABLET, FILM COATED ORAL at 16:53

## 2019-09-03 NOTE — PROGRESS NOTES
Monitor summary: SB-SR 56-81 , [place] 0.16, QRS 0.08, QT 0.38, with frequent PVCs, occasional couplets, and frequent trigeminy per strip from monitor room.

## 2019-09-03 NOTE — DISCHARGE PLANNING
Anticipated Discharge Disposition: Home with HH and Walker    Action: LSW met with pt and pt's spouse, Freddy at bedside to discuss CHOICE for HH and DME.   Pt asked Freddy to provide signature for CHOICE for HH- [facility] and [facility] for DME. Bedside Karo MCDONALD aware of referral placed for [facility].  LSW faxed both CHOICE forms to [facility].    Barriers to Discharge: None.    Plan: Await HH acceptance.     Addendum 1538  LSW informed that pt accepted by [facility]. Bedside Karo MCDONALD and Dr. Weiss aware.

## 2019-09-03 NOTE — DISCHARGE PLANNING
Received Choice form at 1100  Agency/Facility Name: [facility]  Referral sent per Choice form @ 1105    Received Choice form at 1100  Agency/Facility Name: [facility]  Referral sent per Choice form @ 1100

## 2019-09-03 NOTE — PROGRESS NOTES
Assume bedside report and care at 1900.  Pt is A&O x 3, disoriented to time.  Patient call light within reach, bed locked and in lowest position.  Pain is 0/10.  Has numbness and tingling on entire left side of body. Medications given per MAR.  Ambulation to bathroom and back to bd with standby assist, SCDs/ANNABEL hose on.  Skin intact and blanching.  All questions answered at this time.

## 2019-09-03 NOTE — PROGRESS NOTES
Monitor Summary: SB-SR 58-77, TN .16, QRS .10, QT .44 with frequent PVC, occasional trigem, rare couplet, rare bigem per strip from monitor room

## 2019-09-03 NOTE — DISCHARGE PLANNING
Agency/Facility Name: [facility]  Spoke To: Cheri  Outcome: Patient accepted. Cheri notified of discharge date. NareshEZEQUIEL) notified.

## 2019-09-03 NOTE — DISCHARGE INSTRUCTIONS
"Discharge Instructions per Katharine Weiss M.D.    Take your Xarelto as prescribed, follow-up with your primary care physician, heart doctors and stroke bridge clinic in outpatient setting.  Start taking the cholesterol medication atorvastatin 80 mg once daily,  And discuss with your primary care physician to monitor your cholesterol levels.    DIET: Cardiac    ACTIVITY: As tolerated    DIAGNOSIS: Transient ischemic attack, history of stroke    Return to ER if recurrent symptoms including difficulty talking, aphasia, weakness of any part of the body, falls, numbness/any other neurological concerns    __________________________________________________________________________________________________________________        Transient Ischemic Attack  A transient ischemic attack (TIA) is a \"warning stroke\" that causes stroke-like symptoms. Unlike a stroke, a TIA does not cause permanent damage to the brain. The symptoms of a TIA can happen very fast and do not last long. It is important to know the symptoms of a TIA and what to do. This can help prevent a major stroke or death.  What are the causes?  A TIA is caused by a temporary blockage in an artery in the brain or neck (carotid artery). The blockage does not allow the brain to get the blood supply it needs and can cause different symptoms. The blockage can be caused by either:  · A blood clot.  · Fatty buildup (plaque) in a neck or brain artery.  What increases the risk?  · High blood pressure (hypertension).  · High cholesterol.  · Diabetes mellitus.  · Heart disease.  · The buildup of plaque in the blood vessels (peripheral artery disease or atherosclerosis).  · The buildup of plaque in the blood vessels that provide blood and oxygen to the brain (carotid artery stenosis).  · An abnormal heart rhythm (atrial fibrillation).  · Obesity.  · Using any tobacco products, including cigarettes, chewing tobacco, or electronic cigarettes.  · Taking oral contraceptives, " especially in combination with using tobacco.  · Physical inactivity.  · A diet high in fats, salt (sodium), and calories.  · Excessive alcohol use.  · Use of illegal drugs (especially cocaine and methamphetamine).  · Being male.  · Being .  · Being over the age of 55 years.  · Family history of stroke.  · Previous history of blood clots, stroke, TIA, or heart attack.  · Sickle cell disease.  What are the signs or symptoms?  TIA symptoms are the same as a stroke but are temporary. These symptoms usually develop suddenly, or may be newly present upon waking from sleep:  · Sudden weakness or numbness of the face, arm, or leg, especially on one side of the body.  · Sudden trouble walking or difficulty moving arms or legs.  · Sudden confusion.  · Sudden personality changes.  · Trouble speaking (aphasia) or understanding.  · Difficulty swallowing.  · Sudden trouble seeing in one or both eyes.  · Double vision.  · Dizziness.  · Loss of balance or coordination.  · Sudden severe headache with no known cause.  · Trouble reading or writing.  · Loss of bowel or bladder control.  · Loss of consciousness.  How is this diagnosed?  Your health care provider may be able to determine the presence or absence of a TIA based on your symptoms, history, and physical exam. CT scan of the brain is usually performed to help identify a TIA. Other tests may include:  · Electrocardiography (ECG).  · Continuous heart monitoring.  · Echocardiography.  · Carotid ultrasonography.  · MRI.  · A scan of the brain circulation.  · Blood tests.  How is this treated?  Since the symptoms of TIA are the same as a stroke, it is important to seek treatment as soon as possible. You may need a medicine to dissolve a blood clot (thrombolytic) if that is the cause of the TIA. This medicine cannot be given if too much time has passed. Treatment may also include:  · Rest, oxygen, fluids through an IV tube, and medicines to thin the blood  (anticoagulants).  · Measures will be taken to prevent short-term and long-term complications, including infection from breathing foreign material into the lungs (aspiration pneumonia), blood clots in the legs, and falls.  · Procedures to either remove plaque in the carotid arteries or dilate carotid arteries that have narrowed due to plaque. Those procedures are:  ¨ Carotid endarterectomy.  ¨ Carotid angioplasty and stenting.  · Medicines and diet may be used to address diabetes, high blood pressure, and other underlying risk factors.  Follow these instructions at home:  · Take medicines only as directed by your health care provider. Follow the directions carefully. Medicines may be used to control risk factors for a stroke. Be sure you understand all your medicine instructions.  · You may be told to take aspirin or the anticoagulant warfarin. Warfarin needs to be taken exactly as instructed.  ¨ Taking too much or too little warfarin is dangerous. Too much warfarin increases the risk of bleeding. Too little warfarin continues to allow the risk for blood clots. While taking warfarin, you will need to have regular blood tests to measure your blood clotting time. A PT blood test measures how long it takes for blood to clot. Your PT is used to calculate another value called an INR. Your PT and INR help your health care provider to adjust your dose of warfarin. The dose can change for many reasons. It is critically important that you take warfarin exactly as prescribed.  ¨ Many foods, especially foods high in vitamin K can interfere with warfarin and affect the PT and INR. Foods high in vitamin K include spinach, kale, broccoli, cabbage, hunter and turnip greens, [place] sprouts, peas, cauliflower, seaweed, and parsley, as well as beef and pork liver, green tea, and soybean oil. You should eat a consistent amount of foods high in vitamin K. Avoid major changes in your diet, or notify your health care provider before  changing your diet. Arrange a visit with a dietitian to answer your questions.  ¨ Many medicines can interfere with warfarin and affect the PT and INR. You must tell your health care provider about any and all medicines you take; this includes all vitamins and supplements. Be especially cautious with aspirin and anti-inflammatory medicines. Do not take or discontinue any prescribed or over-the-counter medicine except on the advice of your health care provider or pharmacist.  ¨ Warfarin can have side effects, such as excessive bruising or bleeding. You will need to hold pressure over cuts for longer than usual. Your health care provider or pharmacist will discuss other potential side effects.  ¨ Avoid sports or activities that may cause injury or bleeding.  ¨ Be careful when shaving, flossing your teeth, or handling sharp objects.  ¨ Alcohol can change the body's ability to handle warfarin. It is best to avoid alcoholic drinks or consume only very small amounts while taking warfarin. Notify your health care provider if you change your alcohol intake.  ¨ Notify your dentist or other health care providers before procedures.  · Eat a diet that includes 5 or more servings of fruits and vegetables each day. This may reduce the risk of stroke. Certain diets may be prescribed to address high blood pressure, high cholesterol, diabetes, or obesity.  ¨ A diet low in sodium, saturated fat, trans fat, and cholesterol is recommended to manage high blood pressure.  ¨ A diet low in saturated fat, trans fat, and cholesterol, and high in fiber may control cholesterol levels.  ¨ A controlled-carbohydrate, controlled-sugar diet is recommended to manage diabetes.  ¨ A reduced-calorie diet that is low in sodium, saturated fat, trans fat, and cholesterol is recommended to manage obesity.  · Maintain a healthy weight.  · Stay physically active. It is recommended that you get at least 30 minutes of activity on most or all days.  · Do not  use any tobacco products, including cigarettes, chewing tobacco, or electronic cigarettes. If you need help quitting, ask your health care provider.  · Limit alcohol intake to no more than 1 drink per day for nonpregnant women and 2 drinks per day for men. One drink equals 12 ounces of beer, 5 ounces of wine, or 1½ ounces of hard liquor.  · Do not abuse drugs.  · A safe home environment is important to reduce the risk of falls. Your health care provider may arrange for specialists to evaluate your home. Having grab bars in the bedroom and bathroom is often important. Your health care provider may arrange for equipment to be used at home, such as raised toilets and a seat for the shower.  · Follow all instructions for follow-up with your health care provider. This is very important. This includes any referrals and lab tests. Proper follow-up can prevent a stroke or another TIA from occurring.  How is this prevented?  The risk of a TIA can be decreased by appropriately treating high blood pressure, high cholesterol, diabetes, heart disease, and obesity, and by quitting smoking, limiting alcohol, and staying physically active.  Contact a health care provider if:  · You have personality changes.  · You have difficulty swallowing.  · You are seeing double.  · You have dizziness.  · You have a fever.  Get help right away if:  Any of the following symptoms may represent a serious problem that is an emergency. Do not wait to see if the symptoms will go away. Get medical help right away. Call your local emergency services (911 in [place] Do not drive yourself to the hospital.  · You have sudden weakness or numbness of the face, arm, or leg, especially on one side of the body.  · You have sudden trouble walking or difficulty moving arms or legs.  · You have sudden confusion.  · You have trouble speaking (aphasia) or understanding.  · You have sudden trouble seeing in one or both eyes.  · You have a loss of balance or  coordination.  · You have a sudden, severe headache with no known cause.  · You have new chest pain or an irregular heartbeat.  · You have a partial or total loss of consciousness.  This information is not intended to replace advice given to you by your health care provider. Make sure you discuss any questions you have with your health care provider.  Document Released: 09/27/2006 Document Revised: 08/21/2017 Document Reviewed: 03/25/2015  [employer] Patient Education © 2017 [employer]    _____________________________________________________________________________________________________________________    Encephalomalacia  _________________________      Discharge Instructions    Discharged to home by car with relative. Discharged via wheelchair, hospital escort: Yes.  Special equipment needed: Walker    Be sure to schedule a follow-up appointment with your primary care doctor or any specialists as instructed.     Discharge Plan:   Diet Plan: Discussed  Activity Level: Discussed  Confirmed Follow up Appointment: Appointment Scheduled  Confirmed Symptoms Management: Discussed  Medication Reconciliation Updated: Yes  Influenza Vaccine Indication: Patient Refuses    I understand that a diet low in cholesterol, fat, and sodium is recommended for good health. Unless I have been given specific instructions below for another diet, I accept this instruction as my diet prescription.   Other diet: Cardiac Diet    Special Instructions: None    · Is patient discharged on Warfarin / Coumadin?   No     Depression / Suicide Risk    As you are discharged from this [facility] facility, it is important to learn how to keep safe from harming yourself.    Recognize the warning signs:  · Abrupt changes in personality, positive or negative- including increase in energy   · Giving away possessions  · Change in eating patterns- significant weight changes-  positive or negative  · Change in sleeping patterns- unable to  sleep or sleeping all the time   · Unwillingness or inability to communicate  · Depression  · Unusual sadness, discouragement and loneliness  · Talk of wanting to die  · Neglect of personal appearance   · Rebelliousness- reckless behavior  · Withdrawal from people/activities they love  · Confusion- inability to concentrate     If you or a loved one observes any of these behaviors or has concerns about self-harm, here's what you can do:  · Talk about it- your feelings and reasons for harming yourself  · Remove any means that you might use to hurt yourself (examples: pills, rope, extension cords, firearm)  · Get professional help from the community (Mental Health, Substance Abuse, psychological counseling)  · Do not be alone:Call your Safe Contact- someone whom you trust who will be there for you.  · Call your local CRISIS HOTLINE 570-8355 or 580-806-0259  · Call your local Children's Mobile Crisis Response Team [place] (373) 165-0101 or www.VISENZE  · Call the toll free National Suicide Prevention Hotlines   · National Suicide Prevention Lifeline 549-930-NGEC (1124)  · National Hope Line Network 800-SUICIDE (325-1914)

## 2019-09-03 NOTE — PROGRESS NOTES
Rn called to room by pt and family. Family revaluating discharge plan. Ex wife stating considering taking  to her home in  [place].  Eliza notified.

## 2019-09-03 NOTE — PROGRESS NOTES
FW walker was delivered and fitted to patient.  If any further assistance needed, please call extension [phone] or place order for Ortho Technician assistance as a communication order in [employer].

## 2019-09-04 NOTE — PROGRESS NOTES
Pt and family presented with AVS and prescriptions. All questions answered. All personal belongings in patient's possession. Pt taken out by CNA via wheelchair.

## 2022-11-01 NOTE — DISCHARGE SUMMARY
Discharge Summary    CHIEF COMPLAINT ON ADMISSION  Chief Complaint   Patient presents with   • Numbness       Reason for Admission  EMS     Admission Date  9/1/2019    CODE STATUS  Full Code    HPI & HOSPITAL COURSE  This is a 73 y.o. male here with Numbness,      Patient with underlying history of known atrial fibrillation, previous history of stroke, hypertension, underlying PFO presented to the hospital with left-sided numbness.  Patient reported noncompliance/regularly taking his Xarelto.  He was admitted to the hospital, stroke evaluation obtained, patient completed a CT of the head and neck which was negative for any acute concerns.  MRI brain was obtained which did not reveal any acute ischemia/hemorrhage.  Concern for remote ischemic injuries.  Echocardiogram obtained revealed preserved ejection fraction.  Patient was seen by physical therapy and Occupational Therapy and no postacute placement needs were noted.  Otherwise reported findings are stable compared to prior, dyslipidemia noted and patient started on high intensity statin therapy.  DME for walker and orders for home health care written, to be arranged by /case management prior to discharge.  Nursing aware.  Patient advised outpatient follow-up with PCP, cardiology to consider PFO repair and neurology/stroke bridge clinic in outpatient setting.  Schedulers informed to arrange this follow-up.  Upon evaluation on September 3, 2019, patient feels comfortable discharging home.  No other acute concerns, discharge planning discussed with the patient and advise close outpatient follow-up.      Therefore, he is discharged in good and stable condition to home with organized home healthcare and close outpatient follow-up.    The patient met 2-midnight criteria for an inpatient stay at the time of discharge.    Discharge Date  09/03/19    FOLLOW UP ITEMS POST DISCHARGE    Discharge Instructions per Katharine Weiss M.D.    Take your Xarelto as  · C/w home meds  · Delirium precautions prescribed, follow-up with your primary care physician, heart doctors and stroke bridge clinic in outpatient setting.  Start taking the cholesterol medication atorvastatin 80 mg once daily, discussed with your primary care physician to monitor your cholesterol levels.    DIET: Cardiac    ACTIVITY: As tolerated    DIAGNOSIS: Transient ischemic attack, history of stroke    Return to ER if recurrent symptoms including difficulty talking, aphasia, weakness of any part of the body, falls, numbness/any other neurological concerns                  DISCHARGE DIAGNOSES  Principal Problem:    CVA (cerebral vascular accident) ([facility]) POA: Yes  Active Problems:    Hypertension POA: Yes    PAF (paroxysmal atrial fibrillation) ([facility]) POA: Yes    PFO with atrial septal aneurysm POA: Yes    Thrombocytopenia ([facility]) POA: Unknown  Resolved Problems:    * No resolved hospital problems. *      FOLLOW UP  No future appointments.  No follow-up provider specified.    MEDICATIONS ON DISCHARGE     Medication List      START taking these medications      Instructions   atorvastatin 80 MG tablet  Commonly known as:  LIPITOR   Take 1 Tab by mouth every evening.  Dose:  80 mg        CONTINUE taking these medications      Instructions   gabapentin 300 MG Caps  Commonly known as:  NEURONTIN   Take 1 Cap by mouth 3 times a day.  Dose:  300 mg     rivaroxaban 20 MG Tabs tablet  Commonly known as:  XARELTO   Take 1 Tab by mouth with dinner.  Dose:  20 mg            Allergies  No Known Allergies    DIET  Orders Placed This Encounter   Procedures   • Diet Order Cardiac     Standing Status:   Standing     Number of Occurrences:   1     Order Specific Question:   Diet:     Answer:   Cardiac [6]       ACTIVITY  As tolerated.  Weight bearing as tolerated    CONSULTATIONS  None    PROCEDURES  None    LABORATORY  Lab Results   Component Value Date    SODIUM 138 09/01/2019    POTASSIUM 4.1 09/01/2019    CHLORIDE 104 09/01/2019    CO2 24 09/01/2019    GLUCOSE 106  (H) 09/01/2019    BUN 20 09/01/2019    CREATININE 0.93 09/01/2019        Lab Results   Component Value Date    WBC 3.6 (L) 09/02/2019    HEMOGLOBIN 14.7 09/02/2019    HEMATOCRIT 43.7 09/02/2019    PLATELETCT 132 (L) 09/02/2019        Total time of the discharge process exceeds 33 minutes.